# Patient Record
Sex: MALE | Race: WHITE | NOT HISPANIC OR LATINO | Employment: UNEMPLOYED | URBAN - METROPOLITAN AREA
[De-identification: names, ages, dates, MRNs, and addresses within clinical notes are randomized per-mention and may not be internally consistent; named-entity substitution may affect disease eponyms.]

---

## 2022-10-24 ENCOUNTER — OFFICE VISIT (OUTPATIENT)
Dept: SPORTS MEDICINE | Facility: CLINIC | Age: 22
End: 2022-10-24
Payer: COMMERCIAL

## 2022-10-24 VITALS
HEIGHT: 71 IN | DIASTOLIC BLOOD PRESSURE: 80 MMHG | WEIGHT: 215 LBS | SYSTOLIC BLOOD PRESSURE: 134 MMHG | HEART RATE: 84 BPM | BODY MASS INDEX: 30.1 KG/M2

## 2022-10-24 DIAGNOSIS — S93.409A SPRAIN OF LATERAL LIGAMENT OF ANKLE JOINT: Primary | ICD-10-CM

## 2022-10-24 PROCEDURE — 3075F SYST BP GE 130 - 139MM HG: CPT | Mod: CPTII,S$GLB,, | Performed by: ORTHOPAEDIC SURGERY

## 2022-10-24 PROCEDURE — 99204 PR OFFICE/OUTPT VISIT, NEW, LEVL IV, 45-59 MIN: ICD-10-PCS | Mod: S$GLB,,, | Performed by: ORTHOPAEDIC SURGERY

## 2022-10-24 PROCEDURE — 99999 PR PBB SHADOW E&M-EST. PATIENT-LVL III: ICD-10-PCS | Mod: PBBFAC,,, | Performed by: ORTHOPAEDIC SURGERY

## 2022-10-24 PROCEDURE — 99999 PR PBB SHADOW E&M-EST. PATIENT-LVL III: CPT | Mod: PBBFAC,,, | Performed by: ORTHOPAEDIC SURGERY

## 2022-10-24 PROCEDURE — 3075F PR MOST RECENT SYSTOLIC BLOOD PRESS GE 130-139MM HG: ICD-10-PCS | Mod: CPTII,S$GLB,, | Performed by: ORTHOPAEDIC SURGERY

## 2022-10-24 PROCEDURE — 1159F MED LIST DOCD IN RCRD: CPT | Mod: CPTII,S$GLB,, | Performed by: ORTHOPAEDIC SURGERY

## 2022-10-24 PROCEDURE — 1159F PR MEDICATION LIST DOCUMENTED IN MEDICAL RECORD: ICD-10-PCS | Mod: CPTII,S$GLB,, | Performed by: ORTHOPAEDIC SURGERY

## 2022-10-24 PROCEDURE — 3079F DIAST BP 80-89 MM HG: CPT | Mod: CPTII,S$GLB,, | Performed by: ORTHOPAEDIC SURGERY

## 2022-10-24 PROCEDURE — 3079F PR MOST RECENT DIASTOLIC BLOOD PRESSURE 80-89 MM HG: ICD-10-PCS | Mod: CPTII,S$GLB,, | Performed by: ORTHOPAEDIC SURGERY

## 2022-10-24 PROCEDURE — 99204 OFFICE O/P NEW MOD 45 MIN: CPT | Mod: S$GLB,,, | Performed by: ORTHOPAEDIC SURGERY

## 2022-10-24 NOTE — PROGRESS NOTES
NEW PATIENT    HISTORY OF PRESENT ILLNESS   22 y.o. Male with a history of right ankle pain who works as a . Last week he was playing in a recreational soccer game and he rolled his ankle. He denies hearing or feeling a pop. He presented to the ED where he had x-rays and was put in a posterior slab splint. He has not tried to weight bear since the injury.    Is affecting ADLs.  Pain is 3/10 at it's worst.        PAST MEDICAL HISTORY    No past medical history on file.    PAST SURGICAL HISTORY     No past surgical history on file.    FAMILY HISTORY    No family history on file.    SOCIAL HISTORY    Social History     Socioeconomic History    Marital status: Single   Tobacco Use    Smoking status: Never    Smokeless tobacco: Never   Substance and Sexual Activity    Alcohol use: Never    Drug use: Never       MEDICATIONS      Current Outpatient Medications:     naproxen sodium (ANAPROX) 550 MG tablet, Take 1 tablet (550 mg total) by mouth 2 (two) times daily with meals., Disp: 20 tablet, Rfl: 0    ALLERGIES     Review of patient's allergies indicates:  No Known Allergies      REVIEW OF SYSTEMS   Constitution: Negative. Negative for chills, fever and night sweats.   HENT: Negative for congestion and headaches.    Eyes: Negative for blurred vision, left vision loss and right vision loss.   Cardiovascular: Negative for chest pain and syncope.   Respiratory: Negative for cough and shortness of breath.    Endocrine: Negative for polydipsia, polyphagia and polyuria.   Hematologic/Lymphatic: Negative for bleeding problem. Does not bruise/bleed easily.   Skin: Negative for dry skin, itching and rash.   Musculoskeletal: Negative for falls. Positive for right ankle pain  Gastrointestinal: Negative for abdominal pain and bowel incontinence.   Genitourinary: Negative for bladder incontinence and nocturia.   Neurological: Negative for disturbances in coordination, loss of balance and seizures.   Psychiatric/Behavioral:  "Negative for depression. The patient does not have insomnia.    Allergic/Immunologic: Negative for hives and persistent infections.     PHYSICAL EXAMINATION    Vitals: /80   Pulse 84   Ht 5' 11" (1.803 m)   Wt 97.5 kg (215 lb)   BMI 29.99 kg/m²     General: The patient appears active and healthy with no apparent physical problems.  No disturbance of mood or affect is demonstrated. Alert and Oriented.    HEENT: Eyes normal, pupils equally round, nose normal.    Resp: Equal and symmetrical chest rises. No wheezing    CV: Regular rate    Neck: Supple; nonpainful range of motion.    Extremities: no cyanosis, clubbing, edema, or diffuse swelling.  Palpable pulses, good capillary refill of the digits.  No coolness, discoloration, edema or obvious varicosities.    Skin: no lesions noted.    Lymphatic: no detected adenopathy in the upper or lower extremities.    Neurologic: normal mental status, normal reflexes, normal gait and balance.  Patient is alert and oriented to person, place and time.  No flaccidity or spasticity is noted.  No motor or sensory deficits are noted.  Light touch is intact    Orthopaedic:     Ankle Exam-RIGHT    Inspection: Normal skin color and appearance with no scars, no ecchymosis and moderate swelling.  Longitudinal arch is normal.     ROM: 20° dorsiflexion, 40° plantar flexion, 5° inversion and 5° eversion.  There is no pain with ROM testing.  Pain with inversion and dorsiflexion    Palpation: There is no tenderness over the navicular, deltoid ligament, medial malleolus, anterior ankle joint, tibiofibular syndesmosis, lateral malleolus,  CFL, posterior talofibular ligament, sinus tarsi, calcaneus, plantar fascia origin, cuboid, fifth metatarsal, Achilles tendon, posterior tibialis tendon or peroneal tendons.  Tender ATFL, distal fibula    Stability: + Anterior drawer - Talar tilt  Varus and valgus stress reveals no gapping or instability.     Strength: Muscle testing is 5/5 " dorsiflexion, 5/5 plantar flexion, 5/5 inversion and 5/5 eversion.      Neuro: Sensation is normal in L4, L5 and S1 nerve distribution.  Reflexes are 2/2 ankle and 2/2 knee jerk.     Vascular: 2+ Pedal pulses are palpable.  Negative Homans.    Tests: Patient is able to perform heel rise with pain    IMAGING    X-Ray Ankle Complete Right  Narrative: EXAMINATION:  Four views right ankle    CLINICAL HISTORY:  Ankle sprain    COMPARISON:  None    FINDINGS:  Alignment is normal.  There is a subtle osseous fragment along the lateral fibular tip with overlying soft tissue swelling.  Adjacent corticated accessory ossification site is noted.  Impression: Suspect fibular tip avulsive injury    Electronically signed by: Mango Ellsworth MD  Date:    10/17/2022  Time:    05:43      IMPRESSION       ICD-10-CM ICD-9-CM   1. Sprain of lateral ligament of ankle joint  S93.409A 845.00       MEDICATIONS PRESCRIBED      None    RECOMMENDATIONS     Patient placed in a tall walking boot today  Referral to physical therapy placed today  RTC in 5 weeks      All questions were answered, pt will contact us for questions or concerns in the interim.

## 2022-10-25 ENCOUNTER — CLINICAL SUPPORT (OUTPATIENT)
Dept: REHABILITATION | Facility: HOSPITAL | Age: 22
End: 2022-10-25
Attending: ORTHOPAEDIC SURGERY
Payer: COMMERCIAL

## 2022-10-25 DIAGNOSIS — R29.898 DECREASED STRENGTH OF LOWER EXTREMITY: ICD-10-CM

## 2022-10-25 DIAGNOSIS — R26.9 GAIT ABNORMALITY: ICD-10-CM

## 2022-10-25 DIAGNOSIS — S93.409A SPRAIN OF LATERAL LIGAMENT OF ANKLE JOINT: ICD-10-CM

## 2022-10-25 DIAGNOSIS — M25.671 DECREASED RANGE OF MOTION OF RIGHT ANKLE: ICD-10-CM

## 2022-10-25 PROCEDURE — 97161 PT EVAL LOW COMPLEX 20 MIN: CPT

## 2022-10-25 PROCEDURE — 97110 THERAPEUTIC EXERCISES: CPT

## 2022-10-25 NOTE — PLAN OF CARE
[facility]  Physical Therapy Initial Evaluation    Name: Shankar Gaytan  Clinic Number: [id number]    Therapy Diagnosis:   Encounter Diagnoses   Name Primary?    Sprain of lateral ligament of ankle joint     Decreased range of motion of right ankle     Gait abnormality     Decreased strength of lower extremity      Physician: Felton Mccullough MD     Physician Orders: PT Eval and Treat   Medical Diagnosis from Referral: S93.409A (ICD-10-CM) - Sprain of lateral ligament of ankle joint  Evaluation Date: 10/25/2022  Authorization Period Expiration: 10/24/2023  Plan of Care Expiration: 1/22/2023  Progress Note Due: 11/25/2022  Visit # / Visits authorized: 1/1   FOTO: 1/3     Return to MD date: 11/28/2022    Precautions: Standard and HTN    Time In: 1:01 pm   Time Out: 1:54 pm  Total Appointment Time (timed & untimed codes): 53 minutes    SUBJECTIVE   Date of onset: ~1 week (10/16/22)    History of current condition - Shankar reports: A little over a week ago (last Tommy 10/16) [place] and turned quickly and rolled his right ankle. He reports he immediately went down and was painful. He ended up going to the ER later where they completed x-rays and stated he had a chip fracture on his right ankle. He was placed in a splint and saw orthopedist yesterday who looked at his x-rays and stated the chip was an old injury which had healed he re-aggravated it when he rolled his ankle. He was placed in a boot and to begin PT. He is to begin weaning out of the boot in about 2 weeks and transition into an ankle brace. Since the initial injury he has not been doing much or putting any weight through his right leg as he was using crutches. He has just been icing it and elevating it. He still has pain standing on his right lower extremity and even when walking. He is unable to stand or walk for more than 5 minutes without increased  "pain and discomfort. He denies any numbness or tingling. He would like to be able to return to the gym even lifting for upper extremity as soon as he can as he was working out 5-6x a week.   He reports a past medical history of HTN, several ankle sprains playing soccer, left torn pec working out about 2 years ago which he still notices is not a 100%.    Imaging:  X-Ray (10/24/2022):  FINDINGS:  Alignment is normal.  There is a subtle osseous fragment along the lateral fibular tip with overlying soft tissue swelling.  Adjacent corticated accessory ossification site is noted.  X-Ray (10/17/2022):  Avulsion chip fracture distal fibula right, no joint displacement    Prior Therapy: No   Social History: Lives at home with his parents with steps to get into home   Occupation: Works from home   Prior Level of Function: Independent in all ADLs, able to run, go to the gym, and play soccer   Current Level of Function: Difficulty with ADLs, difficulty with ambulation currently wearing a boot, unable to run, go to gym, drive, play soccer.      Pain:  Current 0/10, worst 4/10, best 0/10   Location: right ankle more lateral    Description: Sharp, and lateral ankle bone just feels "sensitive"    Aggravating Factors: Standing, Touching, Walking, and Getting out of bed/chair  Easing Factors: ice, rest, and elevation    Patients goals: To be able to return to his prior level of function. Walking without pain, be able to run, return to the gym, and play soccer.   This year he states there is only one game left before playoffs so he would like to be ready for Spring season for soccer.      Medical History:   No past medical history on file.    Surgical History:   Shankar Gaytan  has no past surgical history on file.    Medications:   Shankar has a current medication list which includes the following prescription(s): naproxen sodium.    Allergies:   Review of patient's allergies indicates:  No Known Allergies       OBJECTIVE "     Observation: Patient is a young male who presents to physical therapy with an overall pleasant general affect who does not appear to be in any acute distress. He presents wearing his boot on his right lower extremity.     Posture: Standing posture demonstrates weight shift to the left lower extremity to offload right with increased arch in non weight bearing with slight drop in weight bearing.     Gait: Patient ambulates independently with boot on right lower extremity donned and lateral lean to left lower extremity, slight abduction during swing, and decreased aurea.     Dermatomes / Sensation: light touch     Right  Left    L3 Intact Intact    L4 Intact Intact   L5 Intact Intact   S1 Intact Intact   S2 Intact Intact     Range of Motion:  Hip   Right  Left    Flexion 95 degrees  100 degrees    Internal Rotation 30 degrees  30 degrees    External Rotation 40 degrees  40 degrees      Knee    Right  Left    Flexion 130 degrees  130 degrees    Extension +3 degrees  +3 degrees      Ankle (AROM)   Right  Left    Dorsiflexion +3 degrees  5 degrees    Plantarflexion 40 degrees  60 degrees    Inversion 12 degrees  30 degrees    Eversion  8 degrees  20 degrees    *Patient reported discomfort with most right ankle range of motion assessment worst with inversion and dorsiflexion.     Lower Extremity Strength (MMT):   Right  Left    Hip Flexion 4/5 4+/5    Hip Abduction 4-/5  4/5    Hip Extension NT  NT    Knee Flexion 4+/5 5/5   Knee Extension 4+/5  5/5   Dorsiflexion 3-/5 4+/5    Plantarflexion 3-/5 5/5   Inversion  3-/5 4+/5   Eversion 3-/5 4+/5    Flexor Hallucis Longus  3-/5  3+/5    *Plantarflexion to be assessed at follow-ups with single leg heel raise     Joint Mobility: Decreased talocrural joint mobility bilaterally with right greater than left, decreased subtalar joint mobility on right    Palpation: Tenderness to palpation along lateral fibula head, mild swelling noted, no ecchymosis noted     Edema  Measurements:    Figure-8 (cm)    Right  57.5 cm   Left  56 cm      Flexibility: To be further assessed at follow-up visits.     Special Tests:  - Anterior Drawer (Ankle): increased laxity noted on right compared to left   - Talar Tilt: (-) bilaterally     Functional Tests:  - Single Leg Balance: Right: unable to perform, Left = 23 sec     Limitation/Restriction for FOTO Ankle Survey    Therapist reviewed FOTO scores for Shankar Gaytan on 10/25/2022.   FOTO documents entered into [employer] - see Media section.    Limitation Score: 53%         TREATMENT     Total Treatment time (time-based codes) separate from Evaluation: 15 minutes      Shankar received the treatments listed below:      therapeutic exercises to develop strength, endurance, ROM, flexibility, and posture for 12 minutes including:    Pt education on PT POC, Prognosis, and HEP   Ankle pumps 1x15 reps   Seated arch lifts 1x5 reps with 5 sec holds   Half kneeling gentle DF mobilizations within tolerance 1x10 reps   Sidelying clamshells with BlueTB 1x12 reps   Demonstration lateral weight shifts working on improving weight bearing tolerance     manual therapy techniques: Joint mobilizations and Soft tissue Mobilization were applied to the: ankle for 3 minutes, including:    Gentle talocrural anterior to posterior mobilizations grade II-III     neuromuscular re-education activities to improve: Balance, Coordination, Kinesthetic, Sense, Proprioception, and Posture for 00 minutes. The following activities were included:    therapeutic activities to improve functional performance for 00 minutes, including:    gait training to improve functional mobility and safety for 00 minutes, including:      PATIENT EDUCATION AND HOME EXERCISES     Education provided:   - Pt education on PT POC, Prognosis, and HEP   - Education on slowly weaning out of boot  - Education on exercises at the gym working on proximal strengthening     Written Home Exercises Provided: yes. Exercises  were reviewed and Shankar was able to demonstrate them prior to the end of the session.  Shankar demonstrated good  understanding of the education provided. See EMR under Patient Instructions for exercises provided during therapy sessions.    ASSESSMENT     Shankar is a 22 y.o. male referred to outpatient Physical Therapy with a medical diagnosis of S93.409A (ICD-10-CM) - Sprain of lateral ligament of ankle joint. Patient presents with decreased range of motion, mild swelling, decreased strength, gait abnormality, impaired balance/proprioception, and functional limitations with walking, squatting, and standing. Patient would benefit from skilled PT intervention to help address above stated deficits and improve overall functional mobility.     Patient prognosis is Excellent.   Patientt will benefit from skilled outpatient Physical Therapy to address the deficits stated above and in the chart below, provide patient /family education, and to maximize patientt's level of independence.     Plan of care discussed with patient: Yes  Patient's spiritual, cultural and educational needs considered and patient is agreeable to the plan of care and goals as stated below:     Anticipated Barriers for therapy: Scheduling     Medical Necessity is demonstrated by the following  History  Co-morbidities and personal factors that may impact the plan of care Co-morbidities:   HTN    Personal Factors:   no deficits     low   Examination  Body Structures and Functions, activity limitations and participation restrictions that may impact the plan of care Body Regions:   lower extremities    Body Systems:    ROM  strength  balance  gait  motor control  motor learning  edema    Participation Restrictions:   Difficulty with ambulation, standing, squatting     Activity limitations:   Learning and applying knowledge  no deficits    General Tasks and Commands  no deficits    Communication  no deficits    Mobility  lifting and carrying  objects  walking  driving (bike, car, motorcycle)    Self care  no deficits    Domestic Life  shopping  cooking  doing house work (cleaning house, washing dishes, laundry)    Interactions/Relationships  no deficits    Life Areas  no deficits    Community and Social Life  community life  recreation and leisure         high   Clinical Presentation stable and uncomplicated low   Decision Making/ Complexity Score: low     Goals:  Short Term Goals: 4-6 weeks   Patient will be independent in initial HEP to help supplement PT.   Patient will improve AROM dorsiflexion to 5 degrees to help with gait mechanics.  Patient will be able to ambulate independently without boot with </= 2/10 pain to improve independence.   Patient will improve single leg balance on right to 15 sec to show improvement in condition.     Long Term Goals: 10-12 weeks   Patient will be independent in updated HEP to help supplement PT and maintain gains made in PT.  Patient will improve FOTO Limitation score to </= 21% to show improvement in condition.   Patient will improve ankle strength to >/= 4/5 to help with return to running.  Patient will be able to return to jogging with no pain to help with quality of life.    PLAN   Plan of care Certification: 10/25/2022 to 1/22/2023.    Outpatient Physical Therapy 1-2 times weekly for 12 weeks to include the following interventions: Electrical Stimulation NMES, Gait Training, Manual Therapy, Moist Heat/ Ice, Neuromuscular Re-ed, Patient Education, Self Care, Therapeutic Activities, and Therapeutic Exercise.     Tasneem Corona, PT    I CERTIFY THE NEED FOR THESE SERVICES FURNISHED UNDER THIS PLAN OF TREATMENT AND WHILE UNDER MY CARE   Physician's comments:     Physician's Signature: ___________________________________________________

## 2022-11-01 ENCOUNTER — CLINICAL SUPPORT (OUTPATIENT)
Dept: REHABILITATION | Facility: HOSPITAL | Age: 22
End: 2022-11-01
Payer: COMMERCIAL

## 2022-11-01 DIAGNOSIS — M25.671 DECREASED RANGE OF MOTION OF RIGHT ANKLE: Primary | ICD-10-CM

## 2022-11-01 DIAGNOSIS — R26.9 GAIT ABNORMALITY: ICD-10-CM

## 2022-11-01 DIAGNOSIS — R29.898 DECREASED STRENGTH OF LOWER EXTREMITY: ICD-10-CM

## 2022-11-01 PROCEDURE — 97112 NEUROMUSCULAR REEDUCATION: CPT

## 2022-11-01 PROCEDURE — 97110 THERAPEUTIC EXERCISES: CPT

## 2022-11-01 PROCEDURE — 97140 MANUAL THERAPY 1/> REGIONS: CPT

## 2022-11-01 NOTE — PROGRESS NOTES
[facility]   Physical Therapy Treatment Note     Name: Shankar Gaytan  Clinic Number: [id number]    Therapy Diagnosis:   Encounter Diagnoses   Name Primary?    Decreased range of motion of right ankle Yes    Gait abnormality     Decreased strength of lower extremity      Physician: Felton Mccullough MD    Visit Date: 11/1/2022  Physician Orders: PT Eval and Treat   Medical Diagnosis from Referral: S93.409A (ICD-10-CM) - Sprain of lateral ligament of ankle joint  Evaluation Date: 10/25/2022  Authorization Period Expiration: 10/24/2023  Plan of Care Expiration: 1/22/2023  Progress Note Due: 11/25/2022  Visit # / Visits authorized: 1/20  FOTO: 1/3      Return to MD date: 11/28/2022     PTA Visit #: 0/5     FOTO first follow up:   FOTO second follow up:     Precautions: Standard and HTN    Time In: 8:58 am  Time Out: 9:55 am  Total Billable Time: 57 minutes    SUBJECTIVE     Pt reports: He is doing pretty good he has been able to go to the gym which he is happy about and then he has transitioned out of the boot and been using the ankle brace which he feels a lot better about.   He was compliant with home exercise program.  Response to previous treatment: Positive  Functional change: Transitioned into ankle brace     Pain: 2/10  Location: right ankles     OBJECTIVE     Objective Measures updated at progress report unless specified.     Treatment     Shankar received the treatments listed below:      therapeutic exercises to develop strength, endurance, ROM, flexibility, and posture for 36 minutes including:    Half kneeling gentle DF mobilizations within tolerance 1x15 reps 3 sec hold with PT assist for maintaining only talocrural motion   Seated heel slides working on DF range of motion 1x10 reps with 5 sec holds   Shuttle DL squats 3x10 reps with 100#  Shuttle SL squats working on weightbearing and DF range 2x10 reps with 50#  Sidelying clamshells with BlackTB 2x12 reps   Sidelying hip abduction  wall tracers 2x10 reps each   RDLs with 10# 2x10 reps   Upright Bike x5 min     manual therapy techniques: Joint mobilizations and Soft tissue Mobilization were applied to the: ankle for 12 minutes, including:    Talocrural anterior to posterior mobilizations grade II-III  Subtalar joint mobilizations grade II-III    neuromuscular re-education activities to improve: Balance, Coordination, Kinesthetic, Sense, and Proprioception for 9 minutes. The following activities were included:    Seated arch lifts 1x10 reps with 5 sec holds   Standing arch lifts 2x10 reps with 5 sec holds   Single Leg Balance 3x30 sec     therapeutic activities to improve functional performance for 00 minutes, including:      gait training to improve functional mobility and safety for 00 minutes, including:      Patient Education and Home Exercises     Home Exercises Provided and Patient Education Provided     Education provided:   - Continue with HEP     Written Home Exercises Provided: Patient instructed to cont prior HEP. Exercises were reviewed and Shankar was able to demonstrate them prior to the end of the session.  Shankar demonstrated good  understanding of the education provided. See EMR under Patient Instructions for exercises provided during therapy sessions    ASSESSMENT     Shankar presented to today's session ambulating with no boot and utilizing an ankle brace. He presents with subjective improvements in pain and function. Continued emphasis on ankle range of motion and foot intrinsic strengthening with good tolerance. He was further challenged with proximal strengthening and improving even weight bearing with no adverse effects. Will continue to monitor and progress as able with emphasis on ankle mobility, gait mechanics, and balance.     Shankar Is progressing well towards his goals.   Pt prognosis is Excellent.     Pt will continue to benefit from skilled outpatient physical therapy to address the deficits listed in the problem list box on  initial evaluation, provide pt/family education and to maximize pt's level of independence in the home and community environment.     Pt's spiritual, cultural and educational needs considered and pt agreeable to plan of care and goals.     Anticipated barriers to physical therapy: Scheduling     Goals:   Short Term Goals: 4-6 weeks (ongoing)  Patient will be independent in initial HEP to help supplement PT.   Patient will improve AROM dorsiflexion to 5 degrees to help with gait mechanics.  Patient will be able to ambulate independently without boot with </= 2/10 pain to improve independence.   Patient will improve single leg balance on right to 15 sec to show improvement in condition.      Long Term Goals: 10-12 weeks (ongoing)  Patient will be independent in updated HEP to help supplement PT and maintain gains made in PT.  Patient will improve FOTO Limitation score to </= 21% to show improvement in condition.   Patient will improve ankle strength to >/= 4/5 to help with return to running.  Patient will be able to return to jogging with no pain to help with quality of life.    PLAN   Plan of care Certification: 10/25/2022 to 1/22/2023.    Continue with current plan of care with emphasis on ankle mobility, proximal lower extremity strengthening, gait mechanics, and balance.     Tasneem Corona, PT

## 2022-11-03 ENCOUNTER — CLINICAL SUPPORT (OUTPATIENT)
Dept: REHABILITATION | Facility: HOSPITAL | Age: 22
End: 2022-11-03
Payer: COMMERCIAL

## 2022-11-03 DIAGNOSIS — R29.898 DECREASED STRENGTH OF LOWER EXTREMITY: ICD-10-CM

## 2022-11-03 DIAGNOSIS — R26.9 GAIT ABNORMALITY: ICD-10-CM

## 2022-11-03 DIAGNOSIS — M25.671 DECREASED RANGE OF MOTION OF RIGHT ANKLE: Primary | ICD-10-CM

## 2022-11-03 PROCEDURE — 97110 THERAPEUTIC EXERCISES: CPT

## 2022-11-03 PROCEDURE — 97112 NEUROMUSCULAR REEDUCATION: CPT

## 2022-11-03 PROCEDURE — 97140 MANUAL THERAPY 1/> REGIONS: CPT

## 2022-11-03 NOTE — PROGRESS NOTES
[facility]   Physical Therapy Treatment Note     Name: Shankar Gaytan  Clinic Number: [id number]    Therapy Diagnosis:   Encounter Diagnoses   Name Primary?    Decreased range of motion of right ankle Yes    Gait abnormality     Decreased strength of lower extremity      Physician: Felton Mccullough MD    Visit Date: 11/3/2022  Physician Orders: PT Eval and Treat   Medical Diagnosis from Referral: S93.409A (ICD-10-CM) - Sprain of lateral ligament of ankle joint  Evaluation Date: 10/25/2022  Authorization Period Expiration: 10/24/2023  Plan of Care Expiration: 1/22/2023  Progress Note Due: 11/25/2022  Visit # / Visits authorized: 2/20  FOTO: 1/3      Return to MD date: 11/28/2022     PTA Visit #: 0/5     FOTO first follow up:   FOTO second follow up:     Precautions: Standard and HTN    Time In: 10:02 am  Time Out: 11:00 am  Total Billable Time: 58 minutes    SUBJECTIVE     Pt reports: He is feeling much better and really happy with his progress within just the last week. He has been continuing with his exercises with only some difficulty with half kneeling exercise but otherwise doing well. His hips were a little sore after last visit.   He was compliant with home exercise program.  Response to previous treatment: Positive  Functional change: Transitioned into ankle brace     Pain: 2/10  Location: right ankles     OBJECTIVE     Objective Measures updated at progress report unless specified.     Treatment     Shankar received the treatments listed below:      therapeutic exercises to develop strength, endurance, ROM, flexibility, and posture for 33 minutes including:    Upright Bike x8 min level 3 resistance   Half kneeling gentle DF mobilizations within tolerance 1x15 reps 3 sec hold with PT assist for maintaining only talocrural motion   Shuttle DL squats 3x10 reps with 125#  Shuttle SL squats working on weightbearing and DF range 2x10 reps with 50#  Lateral band walking with BlueTB 2x20  feet each direction     Not Today:  Seated heel slides working on DF range of motion 1x10 reps with 5 sec holds   Sidelying clamshells with BlackTB 2x12 reps   Sidelying hip abduction wall tracers 2x10 reps each   RDLs with 10# 2x10 reps     manual therapy techniques: Joint mobilizations and Soft tissue Mobilization were applied to the: ankle for 12 minutes, including:    Talocrural anterior to posterior mobilizations grade II-III  Subtalar joint mobilizations grade II-III    neuromuscular re-education activities to improve: Balance, Coordination, Kinesthetic, Sense, and Proprioception for 8 minutes. The following activities were included:    Standing arch lifts 2x10 reps with 5 sec holds   Single Leg Balance 3x30 sec   Working on stepping over megan with heel contact into push off followed with gait training     therapeutic activities to improve functional performance for 00 minutes, including:      gait training to improve functional mobility and safety for 5 minutes, including:    1x300 feet with emphasis on heel contact to push-off     Patient Education and Home Exercises     Home Exercises Provided and Patient Education Provided     Education provided:   - Continue with HEP     Written Home Exercises Provided: Patient instructed to cont prior HEP. Exercises were reviewed and Shankar was able to demonstrate them prior to the end of the session.  Shankar demonstrated good  understanding of the education provided. See EMR under Patient Instructions for exercises provided during therapy sessions    ASSESSMENT     Shankar presents to today's session with subjective reports of improved pain and function. He was able to get to neutral dorsiflexion actively today without increased pain. He continues to be challenged with proximal strengthening as well as foot intrinsics with good tolerance. End of session focused on improving his gait mechanics with good heel strike into push-off. He was adequately challenged and fatigued by  end of session. He continues to benefit from PT tactile and verbal cueing to not collapse into pronation with dorsiflexion range and therex. Will continue to monitor and progress as able.     Shankar Is progressing well towards his goals.   Pt prognosis is Excellent.     Pt will continue to benefit from skilled outpatient physical therapy to address the deficits listed in the problem list box on initial evaluation, provide pt/family education and to maximize pt's level of independence in the home and community environment.     Pt's spiritual, cultural and educational needs considered and pt agreeable to plan of care and goals.     Anticipated barriers to physical therapy: Scheduling     Goals:   Short Term Goals: 4-6 weeks (ongoing)  Patient will be independent in initial HEP to help supplement PT.   Patient will improve AROM dorsiflexion to 5 degrees to help with gait mechanics.  Patient will be able to ambulate independently without boot with </= 2/10 pain to improve independence.   Patient will improve single leg balance on right to 15 sec to show improvement in condition.      Long Term Goals: 10-12 weeks (ongoing)  Patient will be independent in updated HEP to help supplement PT and maintain gains made in PT.  Patient will improve FOTO Limitation score to </= 21% to show improvement in condition.   Patient will improve ankle strength to >/= 4/5 to help with return to running.  Patient will be able to return to jogging with no pain to help with quality of life.    PLAN   Plan of care Certification: 10/25/2022 to 1/22/2023.    Continue with current plan of care with emphasis on ankle mobility, proximal lower extremity strengthening, gait mechanics, and balance.     Tasneem Corona, PT

## 2022-11-08 ENCOUNTER — CLINICAL SUPPORT (OUTPATIENT)
Dept: REHABILITATION | Facility: HOSPITAL | Age: 22
End: 2022-11-08
Payer: COMMERCIAL

## 2022-11-08 DIAGNOSIS — R26.9 GAIT ABNORMALITY: ICD-10-CM

## 2022-11-08 DIAGNOSIS — M25.671 DECREASED RANGE OF MOTION OF RIGHT ANKLE: Primary | ICD-10-CM

## 2022-11-08 DIAGNOSIS — R29.898 DECREASED STRENGTH OF LOWER EXTREMITY: ICD-10-CM

## 2022-11-08 PROCEDURE — 97140 MANUAL THERAPY 1/> REGIONS: CPT

## 2022-11-08 PROCEDURE — 97112 NEUROMUSCULAR REEDUCATION: CPT

## 2022-11-08 PROCEDURE — 97110 THERAPEUTIC EXERCISES: CPT

## 2022-11-08 NOTE — PROGRESS NOTES
[facility]   Physical Therapy Treatment Note     Name: Shankar Gaytan  Clinic Number: [id number]    Therapy Diagnosis:   Encounter Diagnoses   Name Primary?    Decreased range of motion of right ankle Yes    Gait abnormality     Decreased strength of lower extremity      Physician: Felton Mccullough MD    Visit Date: 11/8/2022  Physician Orders: PT Eval and Treat   Medical Diagnosis from Referral: S93.409A (ICD-10-CM) - Sprain of lateral ligament of ankle joint  Evaluation Date: 10/25/2022  Authorization Period Expiration: 10/24/2023  Plan of Care Expiration: 1/22/2023  Progress Note Due: 11/25/2022  Visit # / Visits authorized: 3/20  FOTO: 1/3      Return to MD date: 11/28/2022     PTA Visit #: 0/5     FOTO first follow up:   FOTO second follow up:     Precautions: Standard and HTN    Time In: 8:59 am  Time Out: 9:56 am  Total Billable Time: 57 minutes    SUBJECTIVE     Pt reports: He continues to feel better each day, he is happy with his progress thus far. He has been able to return to the gym and been doing some strengthening there. He feels his walking has improved as well and doing much better with that.   He was compliant with home exercise program.  Response to previous treatment: Positive  Functional change: Transitioned into ankle brace     Pain: 2/10  Location: right ankles     OBJECTIVE     Objective Measures updated at progress report unless specified.     Treatment     Shankar received the treatments listed below:      therapeutic exercises to develop strength, endurance, ROM, flexibility, and posture for 32 minutes including:    Upright Bike x8 min level 3 resistance   Half kneeling gentle DF mobilizations within tolerance 1x15 reps 3 sec hold with PT assist for maintaining only talocrural motion   Sidelying clamshells with BlackTB 2x12 reps   Shuttle Singles Leg squats working on weightbearing and DF range 3x8 reps with 75#  Shuttle Double Leg heel raises 50# 3x10 reps    Lateral band walking with BlueTB 2x40 feet each direction     Not Today:  Seated heel slides working on DF range of motion 1x10 reps with 5 sec holds   Sidelying hip abduction wall tracers 2x10 reps each   RDLs with 10# 2x10 reps     manual therapy techniques: Joint mobilizations and Soft tissue Mobilization were applied to the: ankle for 13 minutes, including:    Talocrural anterior to posterior mobilizations grade III  Subtalar joint mobilizations grade II-III    neuromuscular re-education activities to improve: Balance, Coordination, Kinesthetic, Sense, and Proprioception for 12 minutes. The following activities were included:    Standing arch lifts 2x10 reps with 5 sec holds   Single Leg Balance 3x30 sec   Sliders with dowel working through dorsiflexion range of motion into half lunge and working on push-off 2x10 reps each     therapeutic activities to improve functional performance for 00 minutes, including:      gait training to improve functional mobility and safety for 00 minutes, including:    1x300 feet with emphasis on heel contact to push-off     Patient Education and Home Exercises     Home Exercises Provided and Patient Education Provided     Education provided:   - Continue with HEP     Written Home Exercises Provided: Patient instructed to cont prior HEP. Exercises were reviewed and Shankar was able to demonstrate them prior to the end of the session.  Shankar demonstrated good  understanding of the education provided. See EMR under Patient Instructions for exercises provided during therapy sessions    ASSESSMENT     Shankar is continuing to progress nicely with therapy with noticeably improved gait mechanics and range of motion. Continued emphasis on his ankle mobility with manual therapy with good tolerance. He continues to collapse into pronation with dorsiflexion on right ankle and requires tactile cues to improve. He continues to be challenged with proximal strengthening and adequately fatigued with  hip strengthening by end of session. Continued work on balance training and normalizing his gait. Will continue to monitor and progress as able.     Shankar Is progressing well towards his goals.   Pt prognosis is Excellent.     Pt will continue to benefit from skilled outpatient physical therapy to address the deficits listed in the problem list box on initial evaluation, provide pt/family education and to maximize pt's level of independence in the home and community environment.     Pt's spiritual, cultural and educational needs considered and pt agreeable to plan of care and goals.     Anticipated barriers to physical therapy: Scheduling     Goals:   Short Term Goals: 4-6 weeks (ongoing)  Patient will be independent in initial HEP to help supplement PT. - MET  Patient will improve AROM dorsiflexion to 5 degrees to help with gait mechanics.  Patient will be able to ambulate independently without boot with </= 2/10 pain to improve independence. - MET  Patient will improve single leg balance on right to 15 sec to show improvement in condition.      Long Term Goals: 10-12 weeks (ongoing)  Patient will be independent in updated HEP to help supplement PT and maintain gains made in PT.  Patient will improve FOTO Limitation score to </= 21% to show improvement in condition.   Patient will improve ankle strength to >/= 4/5 to help with return to running.  Patient will be able to return to jogging with no pain to help with quality of life.    PLAN   Plan of care Certification: 10/25/2022 to 1/22/2023.    Continue with current plan of care with emphasis on ankle mobility, proximal lower extremity strengthening, gait mechanics, and balance.     Tasneem Corona, PT

## 2022-11-10 ENCOUNTER — CLINICAL SUPPORT (OUTPATIENT)
Dept: REHABILITATION | Facility: HOSPITAL | Age: 22
End: 2022-11-10
Payer: COMMERCIAL

## 2022-11-10 DIAGNOSIS — R26.9 GAIT ABNORMALITY: ICD-10-CM

## 2022-11-10 DIAGNOSIS — M25.671 DECREASED RANGE OF MOTION OF RIGHT ANKLE: Primary | ICD-10-CM

## 2022-11-10 DIAGNOSIS — R29.898 DECREASED STRENGTH OF LOWER EXTREMITY: ICD-10-CM

## 2022-11-10 PROCEDURE — 97140 MANUAL THERAPY 1/> REGIONS: CPT

## 2022-11-10 PROCEDURE — 97110 THERAPEUTIC EXERCISES: CPT

## 2022-11-10 PROCEDURE — 97112 NEUROMUSCULAR REEDUCATION: CPT

## 2022-11-10 NOTE — PROGRESS NOTES
[facility]   Physical Therapy Treatment Note     Name: Shankar Gaytan  Clinic Number: [id number]    Therapy Diagnosis:   No diagnosis found.      Physician: Felton Mccullough MD    Visit Date: 11/10/2022  Physician Orders: PT Eval and Treat   Medical Diagnosis from Referral: S93.409A (ICD-10-CM) - Sprain of lateral ligament of ankle joint  Evaluation Date: 10/25/2022  Authorization Period Expiration: 10/24/2023  Plan of Care Expiration: 1/22/2023  Progress Note Due: 11/25/2022  Visit # / Visits authorized: 3/20  FOTO: 1/3      Return to MD date: 11/28/2022     PTA Visit #: 0/5     FOTO first follow up:   FOTO second follow up:     Precautions: Standard and HTN    Time In: 8:59 am  Time Out: 10:00 am  Total Billable Time: 61 minutes    SUBJECTIVE     Pt reports: Only using the brace when he's driving, feeling good otherwise. Not wearing the brace at home.   He was compliant with home exercise program.  Response to previous treatment: Positive  Functional change: Transitioned into ankle brace     Pain: 0/10  Location: right ankle    OBJECTIVE     Objective Measures updated at progress report unless specified.     Treatment     Shankar received the treatments listed below:      therapeutic exercises to develop strength, endurance, ROM, flexibility, and posture for 42 minutes including:    Upright Bike x8 min level 5 resistance   Half kneeling gentle DF mobilizations within tolerance 1x10 reps 3 sec hold with PT assist for maintaining only talocrural motion  Eccentric HR- up on 2 down on 1 3x10 B  Lateral band walking with BlueTB 2x50 feet each direction  Split squat into DF range, eccentric lowering 3x8      Not Today:  Shuttle DL squats 3x10 reps with 125#  Shuttle SL squats working on weightbearing and DF range 2x10 reps with 50#  Seated heel slides working on DF range of motion 1x10 reps with 5 sec holds   Sidelying clamshells with BlackTB 2x12 reps   Sidelying hip abduction wall tracers 2x10  reps each   RDLs with 10# 2x10 reps     manual therapy techniques: Joint mobilizations and Soft tissue Mobilization were applied to the: ankle for 8 minutes, including:    Talocrural anterior to posterior mobilizations grade III/IV  Subtalar joint mobilizations grade III/IV    neuromuscular re-education activities to improve: Balance, Coordination, Kinesthetic, Sense, and Proprioception for 11 minutes. The following activities were included:    Seated Alphabet: capital letters 2 sets  Standing arch lifts 2x10 reps with 5 sec holds   Single Leg Balance 3x10 ball toss w/o shoe    NP:  Working on stepping over megan with heel contact into push off followed with gait training     therapeutic activities to improve functional performance for 00 minutes, including:      gait training to improve functional mobility and safety for 00 minutes, including:    1x300 feet with emphasis on heel contact to push-off     Patient Education and Home Exercises     Home Exercises Provided and Patient Education Provided     Education provided:   - Continue with HEP     Written Home Exercises Provided: Patient instructed to cont prior HEP. Exercises were reviewed and Shankar was able to demonstrate them prior to the end of the session.  Shankar demonstrated good  understanding of the education provided. See EMR under Patient Instructions for exercises provided during therapy sessions    ASSESSMENT     Significant deficits in R ankle PF limit activity tolerance at this time. Added eccentric lowering on R LE which initially causes pain but improves with repetition. Visible fatigue noted in R gastroc-soleus with eccentric lowering.     Shankar Is progressing well towards his goals.   Pt prognosis is Excellent.     Pt will continue to benefit from skilled outpatient physical therapy to address the deficits listed in the problem list box on initial evaluation, provide pt/family education and to maximize pt's level of independence in the home and  community environment.     Pt's spiritual, cultural and educational needs considered and pt agreeable to plan of care and goals.     Anticipated barriers to physical therapy: Scheduling     Goals:   Short Term Goals: 4-6 weeks (ongoing)  Patient will be independent in initial HEP to help supplement PT.   Patient will improve AROM dorsiflexion to 5 degrees to help with gait mechanics.  Patient will be able to ambulate independently without boot with </= 2/10 pain to improve independence.   Patient will improve single leg balance on right to 15 sec to show improvement in condition.      Long Term Goals: 10-12 weeks (ongoing)  Patient will be independent in updated HEP to help supplement PT and maintain gains made in PT.  Patient will improve FOTO Limitation score to </= 21% to show improvement in condition.   Patient will improve ankle strength to >/= 4/5 to help with return to running.  Patient will be able to return to jogging with no pain to help with quality of life.    PLAN   Plan of care Certification: 10/25/2022 to 1/22/2023.    Continue with current plan of care with emphasis on ankle mobility, proximal lower extremity strengthening, gait mechanics, and balance.     Orlando Oleary, PT, DPT

## 2022-11-22 ENCOUNTER — CLINICAL SUPPORT (OUTPATIENT)
Dept: REHABILITATION | Facility: HOSPITAL | Age: 22
End: 2022-11-22
Payer: COMMERCIAL

## 2022-11-22 DIAGNOSIS — R26.9 GAIT ABNORMALITY: ICD-10-CM

## 2022-11-22 DIAGNOSIS — M25.671 DECREASED RANGE OF MOTION OF RIGHT ANKLE: Primary | ICD-10-CM

## 2022-11-22 DIAGNOSIS — R29.898 DECREASED STRENGTH OF LOWER EXTREMITY: ICD-10-CM

## 2022-11-22 PROCEDURE — 97140 MANUAL THERAPY 1/> REGIONS: CPT

## 2022-11-22 PROCEDURE — 97112 NEUROMUSCULAR REEDUCATION: CPT

## 2022-11-22 PROCEDURE — 97110 THERAPEUTIC EXERCISES: CPT

## 2022-11-22 NOTE — PROGRESS NOTES
[facility]   Physical Therapy Re-Assessment / Treatment Note     Name: Shankar Gaytan  Clinic Number: [id number]    Therapy Diagnosis:   Encounter Diagnoses   Name Primary?    Decreased range of motion of right ankle Yes    Gait abnormality     Decreased strength of lower extremity      Physician: Felton Mccullough MD    Visit Date: 11/22/2022  Physician Orders: PT Eval and Treat   Medical Diagnosis from Referral: S93.409A (ICD-10-CM) - Sprain of lateral ligament of ankle joint  Evaluation Date: 10/25/2022  Authorization Period Expiration: 10/24/2023  Plan of Care Expiration: 1/22/2023  Progress Note Due: 11/25/2022  Visit # / Visits authorized: 5/20  FOTO: 2/3      Return to MD date: 11/28/2022     PTA Visit #: 0/5     FOTO first follow up: 11/22/2022  FOTO second follow up:     Precautions: Standard and HTN    Time In: 11:00 am  Time Out: 11:58 am  Total Billable Time: 58 minutes    SUBJECTIVE     Pt reports: He has been feeling much better, he feels like he starting to feel more normal like previously. He has been able to go to the gym and start doing lifts but still not where he used to be and still has some difficulty with squatting activities. He has not tried to run or jump either as he is still not fully strong in that ankle. He was able to do calf raises which he was happy about but could only do body weight as he is still weak.   He was compliant with home exercise program.  Response to previous treatment: Positive, improved pain and range  Functional change: Able to go to the gym and walking without difficulty     Pain: 0/10  Location: right ankle    OBJECTIVE     Objective Measures updated at progress report unless specified.     Ankle (AROM)    Right  Left    Dorsiflexion 0 degrees  5 degrees    Plantarflexion 50 degrees  60 degrees    Inversion 30 degrees  30 degrees    Eversion  15 degrees  20 degrees    .  Lower Extremity Strength (MMT):    Right  Left    Hip Flexion 4/5  4+/5    Hip Abduction 4-/5  4/5    Hip Extension 4-/5  4-/5    Knee Flexion 4+/5 5/5   Knee Extension 5/5  5/5   Dorsiflexion 3/5 4+/5    Plantarflexion 3+/5 5/5   Inversion  3+/5 4+/5   Eversion 3+/5 4+/5    Flexor Hallucis Longus  3+/5  4-/5      Functional Tests:  - Single Leg Balance on stable surface: Right = 24 sec , Left = 30 sec   - Double Leg Squat: Able to get past parallel, quad dominant, with early heel off with slight weight shift to left  - Single Leg Step Down: Early heel off on right and mild valgus noted bilaterally     FOTO Limitation Score = 27% (initial = 53%)    Treatment     Shankar received the treatments listed below:      therapeutic exercises to develop strength, endurance, ROM, flexibility, and posture for 33 minutes including:    Assessment as above   Upright Bike x6 min level 5 resistance   Half kneeling gentle DF mobilizations within tolerance 1x15 reps 3 sec hold with PT assist for maintaining only talocrural motion  Step downs on 4-inch step for DF range 2x10 reps   Lateral band walks with BlueTB 2x20 feet each direction  Monster walks with BlueTB 2x20 feet forward/backward   Soleus stretch against the wall 3x20 sec holds   Single Leg heel raises with UE assist on wall 3x8 reps (knee extended)    Not Today:  Sidelying clamshells with BlackTB 2x12 reps   Sidelying hip abduction wall tracers 2x10 reps each   RDLs with 10# 2x10 reps   Split squat into DF range, eccentric lowering 3x8  Eccentric HR- up on 2 down on 1 3x10 B    manual therapy techniques: Joint mobilizations and Soft tissue Mobilization were applied to the: ankle for 9 minutes, including:    Talocrural anterior to posterior mobilizations grade III-IV  Talocrural distraction joint manipulation grade V   - improved mobility and discomfort   Subtalar joint mobilizations grade III-IV    neuromuscular re-education activities to improve: Balance, Coordination, Kinesthetic, Sense, and Proprioception for 16 minutes. The following  activities were included:    Standing arch lifts 2x10 reps with 5 sec holds   Double Leg Heel Raises with tennis ball 2x12 reps with 3 sec holds   Step ups on 12-inch step with opposite march for stability/range 2x12 reps   Single Leg Balance on stable surface with soccer ball toss and kick with opposite foot 2x10 reps forward, 2x10 reps lateral (1 on each foot)     Not Today:  Single Leg Balance 3x10 ball toss w/o shoe    therapeutic activities to improve functional performance for 00 minutes, including:    gait training to improve functional mobility and safety for 00 minutes, including:      Patient Education and Home Exercises     Home Exercises Provided and Patient Education Provided     Education provided:   - Continue with HEP     Written Home Exercises Provided: Patient instructed to cont prior HEP. Exercises were reviewed and Shankar was able to demonstrate them prior to the end of the session.  Shankar demonstrated good  understanding of the education provided. See EMR under Patient Instructions for exercises provided during therapy sessions    ASSESSMENT     Shankar has been seen for a total of 5 visits plus initial evaluation for a medical diagnosis of S93.409A (ICD-10-CM) - Sprain of lateral ligament of ankle joint. He presented to today's session with subjective reports of significant improvements in his mobility, pain, and function. Since starting physical therapy he has improved in his ankle mobility, gait mechanics, lower extremity strength, balance, and function. He continues with limitations in his ankle mobility almost symmetrical to left limiting his full function as well as strengthening and balance. He would continue to benefit from skilled PT to continue to progress to prior level of function and return to recreational activities with running/jumping. He was further challenged with hip strengthening and balance exercises today with good tolerance. Continued focus on ankle mobility and strengthening as  well. Significant stiffness/tightness in soleus and stretch added with good tolerance. Will continue to monitor and progress as able.     Shankar Is progressing well towards his goals.   Pt prognosis is Excellent.     Pt will continue to benefit from skilled outpatient physical therapy to address the deficits listed in the problem list box on initial evaluation, provide pt/family education and to maximize pt's level of independence in the home and community environment.     Pt's spiritual, cultural and educational needs considered and pt agreeable to plan of care and goals.     Anticipated barriers to physical therapy: Scheduling     Goals:   Short Term Goals: 4-6 weeks   Patient will be independent in initial HEP to help supplement PT. - MET  Patient will improve AROM dorsiflexion to 5 degrees to help with gait mechanics. - Ongoing   Patient will be able to ambulate independently without boot with </= 2/10 pain to improve independence. - MET  Patient will improve single leg balance on right to 15 sec to show improvement in condition. - MET     Long Term Goals: 10-12 weeks (ongoing)  Patient will be independent in updated HEP to help supplement PT and maintain gains made in PT. - Ongoing   Patient will improve FOTO Limitation score to </= 21% to show improvement in condition. - Ongoing   Patient will improve ankle strength to >/= 4/5 to help with return to running. - Ongoing   Patient will be able to return to jogging with no pain to help with quality of life. - Ongoing     PLAN   Plan of care Certification: 10/25/2022 to 1/22/2023.    Continue with current plan of care with emphasis on ankle mobility, proximal lower extremity strengthening, gait mechanics, and balance.     Tasneem Corona, PT, DPT

## 2022-11-28 ENCOUNTER — OFFICE VISIT (OUTPATIENT)
Dept: SPORTS MEDICINE | Facility: CLINIC | Age: 22
End: 2022-11-28
Payer: COMMERCIAL

## 2022-11-28 VITALS
SYSTOLIC BLOOD PRESSURE: 123 MMHG | DIASTOLIC BLOOD PRESSURE: 70 MMHG | HEART RATE: 80 BPM | BODY MASS INDEX: 29.97 KG/M2 | WEIGHT: 214.88 LBS

## 2022-11-28 DIAGNOSIS — S93.409A SPRAIN OF LATERAL LIGAMENT OF ANKLE JOINT: Primary | ICD-10-CM

## 2022-11-28 PROCEDURE — 99213 OFFICE O/P EST LOW 20 MIN: CPT | Mod: S$GLB,,, | Performed by: ORTHOPAEDIC SURGERY

## 2022-11-28 PROCEDURE — 3078F PR MOST RECENT DIASTOLIC BLOOD PRESSURE < 80 MM HG: ICD-10-PCS | Mod: CPTII,S$GLB,, | Performed by: ORTHOPAEDIC SURGERY

## 2022-11-28 PROCEDURE — 1159F PR MEDICATION LIST DOCUMENTED IN MEDICAL RECORD: ICD-10-PCS | Mod: CPTII,S$GLB,, | Performed by: ORTHOPAEDIC SURGERY

## 2022-11-28 PROCEDURE — 3074F SYST BP LT 130 MM HG: CPT | Mod: CPTII,S$GLB,, | Performed by: ORTHOPAEDIC SURGERY

## 2022-11-28 PROCEDURE — 99999 PR PBB SHADOW E&M-EST. PATIENT-LVL III: CPT | Mod: PBBFAC,,, | Performed by: ORTHOPAEDIC SURGERY

## 2022-11-28 PROCEDURE — 3008F BODY MASS INDEX DOCD: CPT | Mod: CPTII,S$GLB,, | Performed by: ORTHOPAEDIC SURGERY

## 2022-11-28 PROCEDURE — 99999 PR PBB SHADOW E&M-EST. PATIENT-LVL III: ICD-10-PCS | Mod: PBBFAC,,, | Performed by: ORTHOPAEDIC SURGERY

## 2022-11-28 PROCEDURE — 3074F PR MOST RECENT SYSTOLIC BLOOD PRESSURE < 130 MM HG: ICD-10-PCS | Mod: CPTII,S$GLB,, | Performed by: ORTHOPAEDIC SURGERY

## 2022-11-28 PROCEDURE — 3078F DIAST BP <80 MM HG: CPT | Mod: CPTII,S$GLB,, | Performed by: ORTHOPAEDIC SURGERY

## 2022-11-28 PROCEDURE — 1159F MED LIST DOCD IN RCRD: CPT | Mod: CPTII,S$GLB,, | Performed by: ORTHOPAEDIC SURGERY

## 2022-11-28 PROCEDURE — 3008F PR BODY MASS INDEX (BMI) DOCUMENTED: ICD-10-PCS | Mod: CPTII,S$GLB,, | Performed by: ORTHOPAEDIC SURGERY

## 2022-11-28 PROCEDURE — 99213 PR OFFICE/OUTPT VISIT, EST, LEVL III, 20-29 MIN: ICD-10-PCS | Mod: S$GLB,,, | Performed by: ORTHOPAEDIC SURGERY

## 2022-11-28 NOTE — PROGRESS NOTES
ESTABLISHED PATIENT    History 11/28/2022:  Shankar returns to clinic for follow-up of right lateral ankle sprain. He states he is no longer in pain. He is no longer using crutches or using the walking boot.    History 10/24/2022:   22 y.o. Male with a history of right ankle pain who works as a . Last week he was playing in a recreational soccer game and he rolled his ankle. He denies hearing or feeling a pop. He presented to the ED where he had x-rays and was put in a posterior slab splint. He has not tried to weight bear since the injury.    Is affecting ADLs.  Pain is 0/10 at it's worst.        PAST MEDICAL HISTORY    No past medical history on file.    PAST SURGICAL HISTORY     No past surgical history on file.    FAMILY HISTORY    No family history on file.    SOCIAL HISTORY    Social History     Socioeconomic History    Marital status: Single   Tobacco Use    Smoking status: Never    Smokeless tobacco: Never   Substance and Sexual Activity    Alcohol use: Never    Drug use: Never       MEDICATIONS      Current Outpatient Medications:     naproxen sodium (ANAPROX) 550 MG tablet, Take 1 tablet (550 mg total) by mouth 2 (two) times daily with meals. (Patient not taking: Reported on 11/28/2022), Disp: 20 tablet, Rfl: 0    ALLERGIES     Review of patient's allergies indicates:  No Known Allergies      REVIEW OF SYSTEMS   Constitution: Negative. Negative for chills, fever and night sweats.   HENT: Negative for congestion and headaches.    Eyes: Negative for blurred vision, left vision loss and right vision loss.   Cardiovascular: Negative for chest pain and syncope.   Respiratory: Negative for cough and shortness of breath.    Endocrine: Negative for polydipsia, polyphagia and polyuria.   Hematologic/Lymphatic: Negative for bleeding problem. Does not bruise/bleed easily.   Skin: Negative for dry skin, itching and rash.   Musculoskeletal: Negative for falls. Positive for right ankle pain  Gastrointestinal:  Negative for abdominal pain and bowel incontinence.   Genitourinary: Negative for bladder incontinence and nocturia.   Neurological: Negative for disturbances in coordination, loss of balance and seizures.   Psychiatric/Behavioral: Negative for depression. The patient does not have insomnia.    Allergic/Immunologic: Negative for hives and persistent infections.     PHYSICAL EXAMINATION    Vitals: /70   Pulse 80   Wt 97.5 kg (214 lb 14.4 oz)   BMI 29.97 kg/m²     General: The patient appears active and healthy with no apparent physical problems.  No disturbance of mood or affect is demonstrated. Alert and Oriented.    HEENT: Eyes normal, pupils equally round, nose normal.    Resp: Equal and symmetrical chest rises. No wheezing    CV: Regular rate    Neck: Supple; nonpainful range of motion.    Extremities: no cyanosis, clubbing, edema, or diffuse swelling.  Palpable pulses, good capillary refill of the digits.  No coolness, discoloration, edema or obvious varicosities.    Skin: no lesions noted.    Lymphatic: no detected adenopathy in the upper or lower extremities.    Neurologic: normal mental status, normal reflexes, normal gait and balance.  Patient is alert and oriented to person, place and time.  No flaccidity or spasticity is noted.  No motor or sensory deficits are noted.  Light touch is intact    Orthopaedic:     Ankle Exam-RIGHT    Inspection: Normal skin color and appearance with no scars, no ecchymosis and no longer swollen  Longitudinal arch is normal.     ROM: 20° dorsiflexion, 40° plantar flexion, 5° inversion and 5° eversion.  There is no pain with ROM testing.      Palpation: There is no tenderness over the navicular, deltoid ligament, medial malleolus, anterior ankle joint, tibiofibular syndesmosis, lateral malleolus,  CFL, posterior talofibular ligament, sinus tarsi, calcaneus, plantar fascia origin, cuboid, fifth metatarsal, Achilles tendon, posterior tibialis tendon or peroneal tendons.   Slightly tender ATFL    Stability: + Anterior drawer - Talar tilt  Varus and valgus stress reveals no gapping or instability.     Strength: Muscle testing is 5/5 dorsiflexion, 5/5 plantar flexion, 5/5 inversion and 5/5 eversion.      Neuro: Sensation is normal in L4, L5 and S1 nerve distribution.  Reflexes are 2/2 ankle and 2/2 knee jerk.     Vascular: 2+ Pedal pulses are palpable.  Negative Homans.    Tests: Patient is able to perform heel rise, single leg hop x10    IMAGING    X-Ray Ankle Complete Right  Narrative: EXAMINATION:  Four views right ankle    CLINICAL HISTORY:  Ankle sprain    COMPARISON:  None    FINDINGS:  Alignment is normal.  There is a subtle osseous fragment along the lateral fibular tip with overlying soft tissue swelling.  Adjacent corticated accessory ossification site is noted.  Impression: Suspect fibular tip avulsive injury    Electronically signed by: Mango Ellsworth MD  Date:    10/17/2022  Time:    05:43      IMPRESSION       ICD-10-CM ICD-9-CM   1. Sprain of lateral ligament of ankle joint  S93.409A 845.00         MEDICATIONS PRESCRIBED      None    RECOMMENDATIONS     RTC PRN  Finish physical therapy      All questions were answered, pt will contact us for questions or concerns in the interim.

## 2022-11-29 ENCOUNTER — CLINICAL SUPPORT (OUTPATIENT)
Dept: REHABILITATION | Facility: HOSPITAL | Age: 22
End: 2022-11-29
Payer: COMMERCIAL

## 2022-11-29 DIAGNOSIS — R29.898 DECREASED STRENGTH OF LOWER EXTREMITY: ICD-10-CM

## 2022-11-29 DIAGNOSIS — M25.671 DECREASED RANGE OF MOTION OF RIGHT ANKLE: Primary | ICD-10-CM

## 2022-11-29 DIAGNOSIS — R26.9 GAIT ABNORMALITY: ICD-10-CM

## 2022-11-29 PROCEDURE — 97140 MANUAL THERAPY 1/> REGIONS: CPT

## 2022-11-29 PROCEDURE — 97112 NEUROMUSCULAR REEDUCATION: CPT

## 2022-11-29 PROCEDURE — 97110 THERAPEUTIC EXERCISES: CPT

## 2022-11-29 NOTE — PROGRESS NOTES
[facility]   Physical Therapy Daily Treatment Note     Name: Shankar Gaytan  Clinic Number: [id number]    Therapy Diagnosis:   Encounter Diagnoses   Name Primary?    Decreased range of motion of right ankle Yes    Gait abnormality     Decreased strength of lower extremity      Physician: Felton Mccullough MD    Visit Date: 11/29/2022  Physician Orders: PT Eval and Treat   Medical Diagnosis from Referral: S93.409A (ICD-10-CM) - Sprain of lateral ligament of ankle joint  Evaluation Date: 10/25/2022  Authorization Period Expiration: 10/24/2023  Plan of Care Expiration: 1/22/2023  Progress Note Due: 11/25/2022  Visit # / Visits authorized: 6/20  FOTO: 2/3      Return to MD date: 11/28/2022     PTA Visit #: 0/5     FOTO first follow up: 11/22/2022  FOTO second follow up:     Precautions: Standard and HTN    Time In: 11:01 am  Time Out: 12:00 pm  Total Billable Time: 59 minutes    SUBJECTIVE     Pt reports: He is doing really well. He feels like he is finally turning the corner and he is not having any pain in his ankle. He saw the doctor yesterday who was really happy with his progress. He feels he is improving well and only time it bothers him some is with increased dorsiflexion with squatting/stepping down.   He was compliant with home exercise program.  Response to previous treatment: Positive improved motion  Functional change: No Pain with ambulation, able to hop without difficulty      Pain: 0/10  Location: right ankle    OBJECTIVE     Objective Measures updated at progress report unless specified.     Single Leg Heel Raise:  - Right = 15 reps     Hopping assessment: good aurea and absorbing to push off without difficulty double leg, single leg a little hesitant to start but able to complete without pain/difficulty     Treatment     Shankar received the treatments listed below:      therapeutic exercises to develop strength, endurance, ROM, flexibility, and posture for 19 minutes  including:    Assessment as above   Upright Bike x6 min level 5 resistance   Half kneeling gentle DF mobilizations within tolerance 1x15 reps with 10# DB 3 sec hold   Step downs on 4-inch step for DF range 2x10 reps  Runner's position hip abduction against the wall BlueTB 2x12-15 reps each     Not Today:  Sidelying clamshells with BlackTB 2x12 reps   Sidelying hip abduction wall tracers 2x10 reps each   RDLs with 10# 2x10 reps   Split squat into DF range, eccentric lowering 3x8  Eccentric HR- up on 2 down on 1 3x10 B  Lateral band walks with BlueTB 2x20 feet each direction  Monster walks with BlueTB 2x20 feet forward/backward   Soleus stretch against the wall 3x20 sec holds   Single Leg heel raises with UE assist on wall 3x8 reps (knee extended)    manual therapy techniques: Joint mobilizations and Soft tissue Mobilization were applied to the: ankle for 8 minutes, including:    Talocrural anterior to posterior mobilizations grade III-IV  Talocrural distraction joint manipulation grade V   Subtalar joint mobilizations grade III-IV    neuromuscular re-education activities to improve: Balance, Coordination, Kinesthetic, Sense, and Proprioception for 32 minutes. The following activities were included:    Pt education on return to jog program and provided with copy   Return to Jogging Program:  - DL hops 3x30 reps with 45 sec rest   - DL hops forward/back 3x30 reps with 45 sec rest   - DL hops side/side 3x30 reps with 45 sec rest   - SL hops 3x20 reps with 45 sec rest   - SL hops forward/back 3x20 reps with 45 sec rest   - SL hops side/side 3x20 reps with 45 sec rest     Mini Squats on BOSU ball 3x8 reps     Not Today:  Single Leg Balance 3x10 ball toss w/o shoe  Standing arch lifts 2x10 reps with 5 sec holds   Double Leg Heel Raises with tennis ball 2x12 reps with 3 sec holds   Step ups on 12-inch step with opposite march for stability/range 2x12 reps   Single Leg Balance on stable surface with soccer ball toss and kick  with opposite foot 2x10 reps forward, 2x10 reps lateral (1 on each foot)     therapeutic activities to improve functional performance for 00 minutes, including:    gait training to improve functional mobility and safety for 00 minutes, including:      Patient Education and Home Exercises     Home Exercises Provided and Patient Education Provided     Education provided:   - Continue with HEP     Written Home Exercises Provided: Patient instructed to cont prior HEP. Exercises were reviewed and Shankar was able to demonstrate them prior to the end of the session.  Shankar demonstrated good  understanding of the education provided. See EMR under Patient Instructions for exercises provided during therapy sessions    ASSESSMENT     Shankar is continuing to progress nicely with physical therapy with improvements in range of motion and function. He was able to be progressed with plyometric hopping today to start working on return to [email or web address]. He tolerated hopping well with no pain or adverse effects. He was provided with a copy of return to run program to begin working on at home. Continued work on dorsiflexion range of motion and hip strengthening with good tolerance. Will continue to monitor and progress as able with emphasis on lower extremity strengthening, ankle mobility, balance/proprioception training, and plyometrics.     Shankar Is progressing well towards his goals.   Pt prognosis is Excellent.     Pt will continue to benefit from skilled outpatient physical therapy to address the deficits listed in the problem list box on initial evaluation, provide pt/family education and to maximize pt's level of independence in the home and community environment.     Pt's spiritual, cultural and educational needs considered and pt agreeable to plan of care and goals.     Anticipated barriers to physical therapy: Scheduling     Goals:   Short Term Goals: 4-6 weeks   Patient will be independent in initial HEP to help supplement PT. -  MET  Patient will improve AROM dorsiflexion to 5 degrees to help with gait mechanics. - Ongoing   Patient will be able to ambulate independently without boot with </= 2/10 pain to improve independence. - MET  Patient will improve single leg balance on right to 15 sec to show improvement in condition. - MET     Long Term Goals: 10-12 weeks (ongoing)  Patient will be independent in updated HEP to help supplement PT and maintain gains made in PT. - Ongoing   Patient will improve FOTO Limitation score to </= 21% to show improvement in condition. - Ongoing   Patient will improve ankle strength to >/= 4/5 to help with return to running. - Ongoing   Patient will be able to return to jogging with no pain to help with quality of life. - Ongoing     PLAN   Plan of care Certification: 10/25/2022 to 1/22/2023.    Continue with current plan of care with emphasis on lower extremity strengthening, ankle mobility, balance/proprioception training, and plyometrics.     Tasneem Corona, PT, DPT

## 2025-03-19 ENCOUNTER — OCCUPATIONAL HEALTH (OUTPATIENT)
Dept: URGENT CARE | Facility: CLINIC | Age: 25
End: 2025-03-19

## 2025-03-19 DIAGNOSIS — Z00.00 ENCOUNTER FOR PHYSICAL EXAMINATION: Primary | ICD-10-CM

## 2025-03-19 LAB — BREATH ALCOHOL: 0

## 2025-08-21 ENCOUNTER — OCCUPATIONAL HEALTH (OUTPATIENT)
Dept: URGENT CARE | Facility: CLINIC | Age: 25
End: 2025-08-21

## 2025-08-21 DIAGNOSIS — Z13.9 ENCOUNTER FOR SCREENING: Primary | ICD-10-CM
